# Patient Record
Sex: MALE | Race: WHITE | Employment: UNEMPLOYED | URBAN - METROPOLITAN AREA
[De-identification: names, ages, dates, MRNs, and addresses within clinical notes are randomized per-mention and may not be internally consistent; named-entity substitution may affect disease eponyms.]

---

## 2019-07-16 ENCOUNTER — APPOINTMENT (OUTPATIENT)
Dept: GENERAL RADIOLOGY | Facility: CLINIC | Age: 24
End: 2019-07-16
Attending: EMERGENCY MEDICINE
Payer: COMMERCIAL

## 2019-07-16 ENCOUNTER — HOSPITAL ENCOUNTER (EMERGENCY)
Facility: CLINIC | Age: 24
Discharge: HOME | End: 2019-07-16
Attending: EMERGENCY MEDICINE | Admitting: EMERGENCY MEDICINE
Payer: COMMERCIAL

## 2019-07-16 VITALS
HEIGHT: 65 IN | HEART RATE: 86 BPM | TEMPERATURE: 98.2 F | SYSTOLIC BLOOD PRESSURE: 127 MMHG | RESPIRATION RATE: 16 BRPM | OXYGEN SATURATION: 96 % | WEIGHT: 124 LBS | DIASTOLIC BLOOD PRESSURE: 68 MMHG | BODY MASS INDEX: 20.66 KG/M2

## 2019-07-16 DIAGNOSIS — R94.31 NONSPECIFIC ABNORMAL ELECTROCARDIOGRAM (ECG) (EKG): ICD-10-CM

## 2019-07-16 DIAGNOSIS — R00.0 SINUS TACHYCARDIA: ICD-10-CM

## 2019-07-16 DIAGNOSIS — F41.9 ANXIETY: ICD-10-CM

## 2019-07-16 LAB
ANION GAP SERPL CALCULATED.3IONS-SCNC: 4 MMOL/L (ref 3–14)
BASOPHILS # BLD AUTO: 0 10E9/L (ref 0–0.2)
BASOPHILS NFR BLD AUTO: 0.2 %
BUN SERPL-MCNC: 13 MG/DL (ref 7–30)
CALCIUM SERPL-MCNC: 9.5 MG/DL (ref 8.5–10.1)
CHLORIDE SERPL-SCNC: 109 MMOL/L (ref 94–109)
CO2 SERPL-SCNC: 27 MMOL/L (ref 20–32)
CREAT SERPL-MCNC: 0.98 MG/DL (ref 0.66–1.25)
D DIMER PPP FEU-MCNC: <0.3 UG/ML FEU (ref 0–0.5)
DIFFERENTIAL METHOD BLD: ABNORMAL
EOSINOPHIL # BLD AUTO: 0 10E9/L (ref 0–0.7)
EOSINOPHIL NFR BLD AUTO: 0.4 %
ERYTHROCYTE [DISTWIDTH] IN BLOOD BY AUTOMATED COUNT: 13.2 % (ref 10–15)
GFR SERPL CREATININE-BSD FRML MDRD: >90 ML/MIN/{1.73_M2}
GLUCOSE SERPL-MCNC: 116 MG/DL (ref 70–99)
HCT VFR BLD AUTO: 43.7 % (ref 40–53)
HGB BLD-MCNC: 15.2 G/DL (ref 13.3–17.7)
IMM GRANULOCYTES # BLD: 0 10E9/L (ref 0–0.4)
IMM GRANULOCYTES NFR BLD: 0.4 %
INTERPRETATION ECG - MUSE: NORMAL
LYMPHOCYTES # BLD AUTO: 1.3 10E9/L (ref 0.8–5.3)
LYMPHOCYTES NFR BLD AUTO: 11.8 %
MCH RBC QN AUTO: 27.7 PG (ref 26.5–33)
MCHC RBC AUTO-ENTMCNC: 34.8 G/DL (ref 31.5–36.5)
MCV RBC AUTO: 80 FL (ref 78–100)
MONOCYTES # BLD AUTO: 0.6 10E9/L (ref 0–1.3)
MONOCYTES NFR BLD AUTO: 5.8 %
NEUTROPHILS # BLD AUTO: 8.7 10E9/L (ref 1.6–8.3)
NEUTROPHILS NFR BLD AUTO: 81.4 %
NRBC # BLD AUTO: 0 10*3/UL
NRBC BLD AUTO-RTO: 0 /100
PLATELET # BLD AUTO: 298 10E9/L (ref 150–450)
POTASSIUM SERPL-SCNC: 3.9 MMOL/L (ref 3.4–5.3)
RBC # BLD AUTO: 5.49 10E12/L (ref 4.4–5.9)
SODIUM SERPL-SCNC: 140 MMOL/L (ref 133–144)
TROPONIN I SERPL-MCNC: <0.015 UG/L (ref 0–0.04)
TSH SERPL DL<=0.005 MIU/L-ACNC: 0.84 MU/L (ref 0.4–4)
WBC # BLD AUTO: 10.7 10E9/L (ref 4–11)

## 2019-07-16 PROCEDURE — 25000132 ZZH RX MED GY IP 250 OP 250 PS 637: Performed by: EMERGENCY MEDICINE

## 2019-07-16 PROCEDURE — 80048 BASIC METABOLIC PNL TOTAL CA: CPT | Performed by: EMERGENCY MEDICINE

## 2019-07-16 PROCEDURE — 85025 COMPLETE CBC W/AUTO DIFF WBC: CPT | Performed by: EMERGENCY MEDICINE

## 2019-07-16 PROCEDURE — 71046 X-RAY EXAM CHEST 2 VIEWS: CPT

## 2019-07-16 PROCEDURE — 93005 ELECTROCARDIOGRAM TRACING: CPT

## 2019-07-16 PROCEDURE — 84484 ASSAY OF TROPONIN QUANT: CPT | Performed by: EMERGENCY MEDICINE

## 2019-07-16 PROCEDURE — 99285 EMERGENCY DEPT VISIT HI MDM: CPT | Mod: 25

## 2019-07-16 PROCEDURE — 85379 FIBRIN DEGRADATION QUANT: CPT | Performed by: EMERGENCY MEDICINE

## 2019-07-16 PROCEDURE — 84443 ASSAY THYROID STIM HORMONE: CPT | Performed by: EMERGENCY MEDICINE

## 2019-07-16 RX ORDER — HYDROXYZINE PAMOATE 50 MG/1
50 CAPSULE ORAL EVERY 6 HOURS PRN
Qty: 30 CAPSULE | Refills: 0 | Status: SHIPPED | OUTPATIENT
Start: 2019-07-16 | End: 2019-08-19

## 2019-07-16 RX ORDER — LORAZEPAM 0.5 MG/1
1 TABLET ORAL ONCE
Status: COMPLETED | OUTPATIENT
Start: 2019-07-16 | End: 2019-07-16

## 2019-07-16 RX ORDER — TRAZODONE HYDROCHLORIDE 100 MG/1
100 TABLET ORAL AT BEDTIME
Qty: 30 TABLET | Refills: 0 | Status: SHIPPED | OUTPATIENT
Start: 2019-07-16 | End: 2019-08-19

## 2019-07-16 RX ADMIN — LORAZEPAM 1 MG: 0.5 TABLET ORAL at 18:25

## 2019-07-16 ASSESSMENT — ENCOUNTER SYMPTOMS
PALPITATIONS: 1
NERVOUS/ANXIOUS: 1
APPETITE CHANGE: 1

## 2019-07-16 ASSESSMENT — MIFFLIN-ST. JEOR: SCORE: 1489.34

## 2019-07-16 NOTE — ED AVS SNAPSHOT
Emergency Department  [address] [place] [id number]  Phone:  [phone]  Fax:  [phone]                                    Mahin Porter   MRN: [id number]    Department:   Emergency Department   Date of Visit:  7/16/2019           After Visit Summary Signature Page    I have received my discharge instructions, and my questions have been answered. I have discussed any challenges I see with this plan with the nurse or doctor.    ..........................................................................................................................................  Patient/Patient Representative Signature      ..........................................................................................................................................  Patient Representative Print Name and Relationship to Patient    ..................................................               ................................................  Date                                   Time    ..........................................................................................................................................  Reviewed by Signature/Title    ...................................................              ..............................................  Date                                               Time          22EPIC Rev 08/18

## 2019-07-16 NOTE — ED TRIAGE NOTES
"Patient in with complaints of panic attacks for the past few weeks. States not sleeping well during that time. Complains racing heart. Complains of SOB and feeling tired. States chest discomfort with the racing heart. \"I feel like shit, overwhelmed, anxious..my brain hurts. I feel like I am dying.\"  "
WDL

## 2019-07-16 NOTE — ED PROVIDER NOTES
History     Chief Complaint:  Anxiety    HPI   Mahin Porter is a 24 year old male with a history of anxiety depression who presents with his sister to the ED for the evaluation of anxiety. The patient reports that for the past month he has been experiencing worsening anxiety that causes him to shake intermittently and not be able to sleep, prompting him to the ED. The patient states that he has also been experiencing a decreased appetite for the past month due to his anxiety and that he is also suffering from depression. He states that he recently quit his job and is experiencing some anxiety over being unemployed. He also notes that he will experience intermittent episodes of a racing heart and palpitations. The patient's sister states that they do have a nurse practitioner who they have seen before for anxiety and depression and are planning on setting up an appointment with them in the near future.  No drug use recently. The patient denies suicidal thoughts and other issues.      Allergies:  No Known Drug Allergies     Medications:    The patient is currently on no regular medications.     Past Medical History:    Anxiety  Depression    Past Surgical History:    History reviewed. No pertinent surgical history.    Family History:    Heart murmer    Social History:  The patient was accompanied to the ED by sister.  Smoking Status: Current Every Day Smoker, PPD: 0.50  Smokeless Tobacco: No  Alcohol Use: Yes  Drug use: Marijuana   Marital Status: Single        Review of Systems   Constitutional: Positive for appetite change.   Cardiovascular: Positive for palpitations.   Psychiatric/Behavioral: Negative for suicidal ideas. The patient is nervous/anxious.    All other systems reviewed and are negative.    Physical Exam     Patient Vitals for the past 24 hrs:   BP Temp Temp src Pulse Resp SpO2 Height Weight   07/16/19 1825 127/68 -- -- 86 16 96 % -- --   07/16/19 1529 145/85 98.2  F (36.8  C) Oral 132 20 97 % 1.651  "m (5' 5\") 56.2 kg (124 lb)        Physical Exam  Nursing note and vitals reviewed.  Constitutional:  Appears well-developed and well-nourished.   HENT:   Head:    Atraumatic.   Mouth/Throat:   Oropharynx is clear and moist. No oropharyngeal exudate.   Eyes:    Pupils are equal, round, and reactive to light.   Neck:    Normal range of motion. Neck supple.      No tracheal deviation present. No thyromegaly present.   Cardiovascular:  Tachycardic rate, regular rhythm, no murmur   Pulmonary/Chest: Breath sounds are clear and equal without wheezes or crackles.  Abdominal:   Soft. Bowel sounds are normal. Exhibits no distension and      no mass. There is no tenderness.      There is no rebound and no guarding.   Musculoskeletal:  Exhibits no edema.   Lymphadenopathy:  No cervical adenopathy.   Neurological:   Alert and oriented to person, place, and time.   Skin:    Skin is warm and dry. No rash noted. No pallor.    Psych:   Makes good eye contact, calm and cooperative     Emergency Department Course   ECG (15:34:34):  Rate 115 bpm. NV interval 136. QRS duration 90. QT/QTc 316/437. P-R-T axes 87 94 58. Sinus tachycardia. Biatrial enlargement. Rightward axis. Cannot rule out Anterior infarct, age undetermined. Abnormal ECG.  Interpreted at 1534 by Stacia Boss MD.     Imaging  Radiographic findings were communicated with the patient and sister who voiced understanding of the findings.  Chest XR, PA and LAT  IMPRESSION: No acute pulmonary disease.  As read by Radiology.     Laboratory:  Troponin (1652): <0.015  D dimer quantitative (1652): <0.3  CBC: WNL (WBC 10.7, HGB 15.2, )  BMP: Glucose 116 (H), WNL (Creatinine 0.98)   TSH with free T4 reflex: 0.84    Interventions:  1825, Ativan, 1 mg, IV    Emergency Department Course:  Past medical records, nursing notes, and vitals reviewed.  1608: I performed an exam of the patient and obtained history, as documented above.     IV inserted and blood drawn.    1749: I " spoke with Dr. Smith of Cardiology.     The patient was sent for a chest x ray while in the emergency department, findings above.    1830: I rechecked the patient.  Findings and plan explained to the Patient and sister. Patient discharged home with instructions regarding supportive care, medications, and reasons to return. The importance of close follow-up was reviewed.      Impression & Plan    Medical Decision Making:  I feel that this patient's symptoms are due to anxiety and his heart and blood pressure were much improved after Ativan orally here. He did have nonspecific ECG abnormalities so I discussed the case with Dr. Smith who was the on call for Cardiology who agreed that these changes were nonspecific and most likely due to the patient's body habitus. Based on our discussion I ordered an outpatient cardiac echo and cardiology follow up especially considering the patient's family history of heart murmer and because of his unexplained sinus tachycardia which could possibly be due to a pheochromocytoma, although I feel this is very unlikely since his blood pressure and heart did return to normal here. I discussed with the patient and his sister that he should have follow up with cardiology and they could perform 24 hour urine collection to check for this. He was instructed on signs and symptoms to return for. There was no sign of myocardial infarction or pulmonary embolism. He does not have risk factor for PE and his d dimer is normal. He is not hypoxic so I did not feel that chest ct was indicated. There was no sign of pneumothorax or pneumonia and I did not feel that there was a sign for pericardial effusion. I felt that he could be safely discharged home. He was prescribed trazodone for sleep and hydroxyzine for anxiety. I told him not to consume alcohol or drive a car eight hours after taking these. He declined DEC evaluation and he is not suicidal or homicidal and not having symptoms of psychosis. He  has a plan to contact his psychologist Tee at [facility] and she is also a prescriber who can also prescribe him an antidepressant and antianxiety medication since she is a nurse practitioner. I felt that he could be safely discharged to home.     Diagnosis:    ICD-10-CM    1. Anxiety F41.9    2. Sinus tachycardia R00.0 Echocardiogram Complete with Bubble     Follow-Up with Cardiologist   3. Nonspecific abnormal electrocardiogram (ECG) (EKG) R94.31        Disposition:  discharged to home    Discharge Medications:     Medication List      Started    hydrOXYzine 50 MG capsule  Commonly known as:  VISTARIL  50 mg, Oral, EVERY 6 HOURS PRN, No driving a car or drinking alcohol for 6 hours after taking this medication.     traZODone 100 MG tablet  Commonly known as:  DESYREL  100 mg, Oral, AT BEDTIME, No driving a car or drinking alcohol for 8 hours after taking this medication.          Scribe Disclosure:  I, Zack Andrade, am serving as a scribe at 3:42 PM on 7/16/2019 to document services personally performed by Stacia Boss MD based on my observations and the provider's statements to me.      Zack Andrade  7/16/2019    EMERGENCY DEPARTMENT       Stacia Boss MD  07/16/19 2661

## 2019-07-18 ENCOUNTER — HOSPITAL ENCOUNTER (OUTPATIENT)
Dept: CARDIOLOGY | Facility: CLINIC | Age: 24
Discharge: HOME | End: 2019-07-18
Attending: EMERGENCY MEDICINE | Admitting: EMERGENCY MEDICINE
Payer: COMMERCIAL

## 2019-07-18 DIAGNOSIS — R00.0 SINUS TACHYCARDIA: ICD-10-CM

## 2019-07-18 PROCEDURE — 93306 TTE W/DOPPLER COMPLETE: CPT | Mod: 26 | Performed by: INTERNAL MEDICINE

## 2019-07-18 PROCEDURE — 93306 TTE W/DOPPLER COMPLETE: CPT

## 2019-07-22 ENCOUNTER — OFFICE VISIT (OUTPATIENT)
Dept: CARDIOLOGY | Facility: CLINIC | Age: 24
End: 2019-07-22
Attending: EMERGENCY MEDICINE
Payer: COMMERCIAL

## 2019-07-22 VITALS
HEART RATE: 80 BPM | DIASTOLIC BLOOD PRESSURE: 79 MMHG | SYSTOLIC BLOOD PRESSURE: 124 MMHG | HEIGHT: 65 IN | WEIGHT: 126 LBS | BODY MASS INDEX: 20.99 KG/M2

## 2019-07-22 DIAGNOSIS — F41.9 ANXIETY: ICD-10-CM

## 2019-07-22 DIAGNOSIS — R00.2 PALPITATIONS: Primary | ICD-10-CM

## 2019-07-22 DIAGNOSIS — R00.0 SINUS TACHYCARDIA: ICD-10-CM

## 2019-07-22 PROBLEM — F43.23 ADJUSTMENT DISORDER WITH MIXED ANXIETY AND DEPRESSED MOOD: Status: ACTIVE | Noted: 2019-07-22

## 2019-07-22 PROCEDURE — 99204 OFFICE O/P NEW MOD 45 MIN: CPT | Performed by: INTERNAL MEDICINE

## 2019-07-22 ASSESSMENT — MIFFLIN-ST. JEOR: SCORE: 1498.41

## 2019-07-22 NOTE — PROGRESS NOTES
HPI and Plan:   This 24-year-old young man is seen, accompanied by his mother, in follow-up after recent emergency room visit where he was noted to be quite tachycardic and mildly hypertensive.  There is a question of an abnormal EKG.  Follow-up evaluation to reassess this was recommended and requested.    History is from him but also from his mother.  He reports a history of anxiety and what sounds like anxious depression.  Previously helped considerably by counseling but he has not been in that recently.  More recently he is acutely worsened with recurrent panic and anxiety attacks.  His mother notes this been worse after a loss of a job recently.  The can come on quite suddenly even when he is doing normal activities and he is not sure why they are precipitated but he starts thinking about perseverating upon mortality and his health and this makes him even more anxious and he started to feel his heart race.  He then feels a sense of dread overwhelmed, anxious like his brain hurts his symptoms like he might be dying.  He often calls a sister somebody else and with encouragement and counseling symptoms slowly resolve.  He never gets palpitations or rapid heart rate in the absence of 1 of these episodes.  He has tried to get into see his counselor but has not been able to get an appointment.  He becomes anxious and tearful when discussing this at times and that does precipitate some tachycardia that goes away when he relaxes.  His mother confirms this history and also becomes tearful at times because she is distracted how hard it is to get him care and how poorly he feels when he has these episodes.    In between episodes he is able to do things physically normally such as skateboarding and other activities.  He has not had syncope or near syncope, denies shortness of breath when active and not having anxiety episodes, and is without orthopnea edema or PND or orthostasis.  He only gets his tachycardia after the  anxiety symptoms come on.    Exam detailed below.  Essentially normal physical exam today other than evidence of anxiety intermittently.    EKG from the emergency room visit is reviewed.  There is sinus tachycardia.  There is borderline right axis deviation and prominent voltage but this thing is within normal limits for this thin young male.    He had an echocardiogram in follow-up.  I reviewed the results with him and his mother.  This is essentially a normal echocardiogram.  There is a trace positive bubble study with Valsalva.    Impression/plan    1-anxiety disorder likely with some depression.  He clearly is quite affected by this.  He is not having any suicidal or self-harm thoughts however.  I strongly urged him to recontact his previous counselor as at school he would like to see because he thought she was quite effective.  He should try to let them know he has been in the emergency department and needs sooner rather than later help in management.  I do not think further cardiac evaluation useful at this time.  I discussed that with him and his mother.  He actually feels quite reassured by this and found it helpful.        No orders of the defined types were placed in this encounter.      No orders of the defined types were placed in this encounter.      There are no discontinued medications.      Encounter Diagnoses   Name Primary?     Palpitations Yes     Sinus tachycardia      Anxiety        CURRENT MEDICATIONS:  Current Outpatient Medications   Medication Sig Dispense Refill     hydrOXYzine (VISTARIL) 50 MG capsule Take 1 capsule (50 mg) by mouth every 6 hours as needed for anxiety No driving a car or drinking alcohol for 6 hours after taking this medication. 30 capsule 0     traZODone (DESYREL) 100 MG tablet Take 1 tablet (100 mg) by mouth At Bedtime No driving a car or drinking alcohol for 8 hours after taking this medication. 30 tablet 0       ALLERGIES   No Known Allergies    PAST MEDICAL  HISTORY:  History reviewed. No pertinent past medical history.    PAST SURGICAL HISTORY:  History reviewed. No pertinent surgical history.    FAMILY HISTORY:  Family History   Problem Relation Age of Onset     Coronary Artery Disease Father      Depression Father      Lung Cancer Maternal Grandmother      Lymphoma Maternal Grandfather      Lung Cancer Paternal Grandmother      Depression Sister      Anxiety Disorder Sister        SOCIAL HISTORY:  Social History     Socioeconomic History     Marital status: Single     Spouse name: None     Number of children: None     Years of education: None     Highest education level: None   Occupational History     None   Social Needs     Financial resource strain: None     Food insecurity:     Worry: None     Inability: None     Transportation needs:     Medical: None     Non-medical: None   Tobacco Use     Smoking status: Current Every Day Smoker     Packs/day: 0.50     Types: Cigarettes     Start date: 2010     Smokeless tobacco: Never Used   Substance and Sexual Activity     Alcohol use: Yes     Comment: holidays     Drug use: Yes     Types: Marijuana     Sexual activity: None   Lifestyle     Physical activity:     Days per week: None     Minutes per session: None     Stress: None   Relationships     Social connections:     Talks on phone: None     Gets together: None     Attends [employer] service: None     Active member of club or organization: None     Attends meetings of clubs or organizations: None     Relationship status: None     Intimate partner violence:     Fear of current or ex partner: None     Emotionally abused: None     Physically abused: None     Forced sexual activity: None   Other Topics Concern     Parent/sibling w/ CABG, MI or angioplasty before 65F 55M? Not Asked   Social History Narrative     None       Review of Systems:  Skin:  Negative       Eyes:  Positive for glasses    ENT:  Positive for nasal congestion    Respiratory:  Negative      "  Cardiovascular:  Negative      Gastroenterology: Positive for heartburn;reflux possible ulcer  Genitourinary:  Negative      Musculoskeletal:  Positive for back pain    Neurologic:  Negative      Psychiatric:  Positive for anxiety;depression treated with Trazadone  Heme/Lymph/Imm:  Negative      Endocrine:  Negative        Physical Exam:  Vitals: /79   Pulse 80   Ht 1.651 m (5' 5\")   Wt 57.2 kg (126 lb)   BMI 20.97 kg/m      Constitutional:  cooperative, alert and oriented, well developed, well nourished, in no acute distress        Skin:  warm and dry to the touch, no apparent skin lesions or masses noted          Head:  normocephalic, no masses or lesions        Eyes:  pupils equal and round;sclera white;EOMS intact        Lymph:      ENT:  no pallor or cyanosis        Neck:  carotid pulses are full and equal bilaterally, JVP normal, no carotid bruit        Respiratory:  normal breath sounds, clear to auscultation, normal A-P diameter, normal symmetry, normal respiratory excursion, no use of accessory muscles         Cardiac: regular rhythm, normal S1/S2, no S3 or S4, apical impulse not displaced, no murmurs, gallops or rubs(Grets tachycardic when discussing his anxiety feelings and getting tearful)                pulses full and equal                                        GI:  abdomen soft;no HSM;non-tender;no bruits        Extremities and Muscular Skeletal:  no deformities, clubbing, cyanosis, erythema observed;no edema              Neurological:  no gross motor deficits        Psych:  Alert and Oriented x 3 Anxious      CC  Stacia Boss MD  [facility]  [address] [address]              "

## 2019-07-22 NOTE — LETTER
7/22/2019    Physician No Ref-Primary  No address on file    RE: Mahin HUBER German       Dear Colleague,    I had the pleasure of seeing Mahin Porter in the [facility].    HPI and Plan:   This 24-year-old young man is seen, accompanied by his mother, in follow-up after recent emergency room visit where he was noted to be quite tachycardic and mildly hypertensive.  There is a question of an abnormal EKG.  Follow-up evaluation to reassess this was recommended and requested.    History is from him but also from his mother.  He reports a history of anxiety and what sounds like anxious depression.  Previously helped considerably by counseling but he has not been in that recently.  More recently he is acutely worsened with recurrent panic and anxiety attacks.  His mother notes this been worse after a loss of a job recently.  The can come on quite suddenly even when he is doing normal activities and he is not sure why they are precipitated but he starts thinking about perseverating upon mortality and his health and this makes him even more anxious and he started to feel his heart race.  He then feels a sense of dread overwhelmed, anxious like his brain hurts his symptoms like he might be dying.  He often calls a sister somebody else and with encouragement and counseling symptoms slowly resolve.  He never gets palpitations or rapid heart rate in the absence of 1 of these episodes.  He has tried to get into see his counselor but has not been able to get an appointment.  He becomes anxious and tearful when discussing this at times and that does precipitate some tachycardia that goes away when he relaxes.  His mother confirms this history and also becomes tearful at times because she is distracted how hard it is to get him care and how poorly he feels when he has these episodes.    In between episodes he is able to do things physically normally such as skateboarding and other activities.  He has  not had syncope or near syncope, denies shortness of breath when active and not having anxiety episodes, and is without orthopnea edema or PND or orthostasis.  He only gets his tachycardia after the anxiety symptoms come on.    Exam detailed below.  Essentially normal physical exam today other than evidence of anxiety intermittently.    EKG from the emergency room visit is reviewed.  There is sinus tachycardia.  There is borderline right axis deviation and prominent voltage but this thing is within normal limits for this thin young male.    He had an echocardiogram in follow-up.  I reviewed the results with him and his mother.  This is essentially a normal echocardiogram.  There is a trace positive bubble study with Valsalva.    Impression/plan    1-anxiety disorder likely with some depression.  He clearly is quite affected by this.  He is not having any suicidal or self-harm thoughts however.  I strongly urged him to recontact his previous counselor as at school he would like to see because he thought she was quite effective.  He should try to let them know he has been in the emergency department and needs sooner rather than later help in management.  I do not think further cardiac evaluation useful at this time.  I discussed that with him and his mother.  He actually feels quite reassured by this and found it helpful.        No orders of the defined types were placed in this encounter.      No orders of the defined types were placed in this encounter.      There are no discontinued medications.      Encounter Diagnoses   Name Primary?     Palpitations Yes     Sinus tachycardia      Anxiety        CURRENT MEDICATIONS:  Current Outpatient Medications   Medication Sig Dispense Refill     hydrOXYzine (VISTARIL) 50 MG capsule Take 1 capsule (50 mg) by mouth every 6 hours as needed for anxiety No driving a car or drinking alcohol for 6 hours after taking this medication. 30 capsule 0     traZODone (DESYREL) 100 MG  tablet Take 1 tablet (100 mg) by mouth At Bedtime No driving a car or drinking alcohol for 8 hours after taking this medication. 30 tablet 0       ALLERGIES   No Known Allergies    PAST MEDICAL HISTORY:  History reviewed. No pertinent past medical history.    PAST SURGICAL HISTORY:  History reviewed. No pertinent surgical history.    FAMILY HISTORY:  Family History   Problem Relation Age of Onset     Coronary Artery Disease Father      Depression Father      Lung Cancer Maternal Grandmother      Lymphoma Maternal Grandfather      Lung Cancer Paternal Grandmother      Depression Sister      Anxiety Disorder Sister        SOCIAL HISTORY:  Social History     Socioeconomic History     Marital status: Single     Spouse name: None     Number of children: None     Years of education: None     Highest education level: None   Occupational History     None   Social Needs     Financial resource strain: None     Food insecurity:     Worry: None     Inability: None     Transportation needs:     Medical: None     Non-medical: None   Tobacco Use     Smoking status: Current Every Day Smoker     Packs/day: 0.50     Types: Cigarettes     Start date: 2010     Smokeless tobacco: Never Used   Substance and Sexual Activity     Alcohol use: Yes     Comment: holidays     Drug use: Yes     Types: Marijuana     Sexual activity: None   Lifestyle     Physical activity:     Days per week: None     Minutes per session: None     Stress: None   Relationships     Social connections:     Talks on phone: None     Gets together: None     Attends Jewish service: None     Active member of club or organization: None     Attends meetings of clubs or organizations: None     Relationship status: None     Intimate partner violence:     Fear of current or ex partner: None     Emotionally abused: None     Physically abused: None     Forced sexual activity: None   Other Topics Concern     Parent/sibling w/ CABG, MI or angioplasty before 65F 55M? Not Asked  "  Social History Narrative     None       Review of Systems:  Skin:  Negative       Eyes:  Positive for glasses    ENT:  Positive for nasal congestion    Respiratory:  Negative       Cardiovascular:  Negative      Gastroenterology: Positive for heartburn;reflux possible ulcer  Genitourinary:  Negative      Musculoskeletal:  Positive for back pain    Neurologic:  Negative      Psychiatric:  Positive for anxiety;depression treated with Trazadone  Heme/Lymph/Imm:  Negative      Endocrine:  Negative        Physical Exam:  Vitals: /79   Pulse 80   Ht 1.651 m (5' 5\")   Wt 57.2 kg (126 lb)   BMI 20.97 kg/m       Constitutional:  cooperative, alert and oriented, well developed, well nourished, in no acute distress        Skin:  warm and dry to the touch, no apparent skin lesions or masses noted          Head:  normocephalic, no masses or lesions        Eyes:  pupils equal and round;sclera white;EOMS intact        Lymph:      ENT:  no pallor or cyanosis        Neck:  carotid pulses are full and equal bilaterally, JVP normal, no carotid bruit        Respiratory:  normal breath sounds, clear to auscultation, normal A-P diameter, normal symmetry, normal respiratory excursion, no use of accessory muscles         Cardiac: regular rhythm, normal S1/S2, no S3 or S4, apical impulse not displaced, no murmurs, gallops or rubs(Grets tachycardic when discussing his anxiety feelings and getting tearful)                pulses full and equal                                        GI:  abdomen soft;no HSM;non-tender;no bruits        Extremities and Muscular Skeletal:  no deformities, clubbing, cyanosis, erythema observed;no edema              Neurological:  no gross motor deficits        Psych:  Alert and Oriented x 3 Anxious      CC  Stacia Boss MD  [facility]  [address] [address]                Thank you for allowing me to participate in the care of your patient.      Sincerely,     Liborio" Apolinar Smith MD     [facility]    cc:   Stacia Boss MD  [facility]  [address] [facility] [address]  [place] [address]

## 2019-07-24 ENCOUNTER — OFFICE VISIT (OUTPATIENT)
Dept: FAMILY MEDICINE | Facility: CLINIC | Age: 24
End: 2019-07-24
Payer: COMMERCIAL

## 2019-07-24 VITALS
BODY MASS INDEX: 21.16 KG/M2 | RESPIRATION RATE: 20 BRPM | HEART RATE: 95 BPM | SYSTOLIC BLOOD PRESSURE: 120 MMHG | HEIGHT: 65 IN | DIASTOLIC BLOOD PRESSURE: 60 MMHG | WEIGHT: 127 LBS | OXYGEN SATURATION: 98 % | TEMPERATURE: 98.1 F

## 2019-07-24 DIAGNOSIS — F43.23 ADJUSTMENT DISORDER WITH MIXED ANXIETY AND DEPRESSED MOOD: ICD-10-CM

## 2019-07-24 DIAGNOSIS — Z00.00 ROUTINE GENERAL MEDICAL EXAMINATION AT A HEALTH CARE FACILITY: Primary | ICD-10-CM

## 2019-07-24 DIAGNOSIS — F41.9 ANXIETY: ICD-10-CM

## 2019-07-24 LAB
ALBUMIN UR-MCNC: NEGATIVE MG/DL
APPEARANCE UR: CLEAR
BASOPHILS # BLD AUTO: 0 10E9/L (ref 0–0.2)
BASOPHILS NFR BLD AUTO: 0.6 %
BILIRUB UR QL STRIP: NEGATIVE
COLOR UR AUTO: YELLOW
DIFFERENTIAL METHOD BLD: NORMAL
EOSINOPHIL # BLD AUTO: 0.2 10E9/L (ref 0–0.7)
EOSINOPHIL NFR BLD AUTO: 2.7 %
ERYTHROCYTE [DISTWIDTH] IN BLOOD BY AUTOMATED COUNT: 13.6 % (ref 10–15)
GLUCOSE UR STRIP-MCNC: NEGATIVE MG/DL
HCT VFR BLD AUTO: 40.7 % (ref 40–53)
HGB BLD-MCNC: 14.3 G/DL (ref 13.3–17.7)
HGB UR QL STRIP: NEGATIVE
KETONES UR STRIP-MCNC: NEGATIVE MG/DL
LEUKOCYTE ESTERASE UR QL STRIP: NEGATIVE
LYMPHOCYTES # BLD AUTO: 1.8 10E9/L (ref 0.8–5.3)
LYMPHOCYTES NFR BLD AUTO: 25.4 %
MCH RBC QN AUTO: 28.3 PG (ref 26.5–33)
MCHC RBC AUTO-ENTMCNC: 35.1 G/DL (ref 31.5–36.5)
MCV RBC AUTO: 81 FL (ref 78–100)
MONOCYTES # BLD AUTO: 0.5 10E9/L (ref 0–1.3)
MONOCYTES NFR BLD AUTO: 7 %
NEUTROPHILS # BLD AUTO: 4.6 10E9/L (ref 1.6–8.3)
NEUTROPHILS NFR BLD AUTO: 64.3 %
NITRATE UR QL: NEGATIVE
PH UR STRIP: 7 PH (ref 5–7)
PLATELET # BLD AUTO: 274 10E9/L (ref 150–450)
RBC # BLD AUTO: 5.05 10E12/L (ref 4.4–5.9)
RBC #/AREA URNS AUTO: NORMAL /HPF
SOURCE: NORMAL
SP GR UR STRIP: 1.01 (ref 1–1.03)
UROBILINOGEN UR STRIP-ACNC: 0.2 EU/DL (ref 0.2–1)
WBC # BLD AUTO: 7.2 10E9/L (ref 4–11)
WBC #/AREA URNS AUTO: NORMAL /HPF

## 2019-07-24 PROCEDURE — 85025 COMPLETE CBC W/AUTO DIFF WBC: CPT | Performed by: FAMILY MEDICINE

## 2019-07-24 PROCEDURE — 36415 COLL VENOUS BLD VENIPUNCTURE: CPT | Performed by: FAMILY MEDICINE

## 2019-07-24 PROCEDURE — 80053 COMPREHEN METABOLIC PANEL: CPT | Performed by: FAMILY MEDICINE

## 2019-07-24 PROCEDURE — 80061 LIPID PANEL: CPT | Performed by: FAMILY MEDICINE

## 2019-07-24 PROCEDURE — 81001 URINALYSIS AUTO W/SCOPE: CPT | Performed by: FAMILY MEDICINE

## 2019-07-24 PROCEDURE — 84443 ASSAY THYROID STIM HORMONE: CPT | Performed by: FAMILY MEDICINE

## 2019-07-24 PROCEDURE — 99385 PREV VISIT NEW AGE 18-39: CPT | Performed by: FAMILY MEDICINE

## 2019-07-24 RX ORDER — ESCITALOPRAM OXALATE 10 MG/1
TABLET ORAL
Refills: 2 | COMMUNITY
Start: 2018-12-02 | End: 2019-07-24

## 2019-07-24 RX ORDER — BUPROPION HYDROCHLORIDE 150 MG/1
150 TABLET ORAL EVERY MORNING
Qty: 30 TABLET | Refills: 1 | Status: SHIPPED | OUTPATIENT
Start: 2019-07-24 | End: 2019-08-27 | Stop reason: DRUGHIGH

## 2019-07-24 ASSESSMENT — ANXIETY QUESTIONNAIRES
5. BEING SO RESTLESS THAT IT IS HARD TO SIT STILL: SEVERAL DAYS
7. FEELING AFRAID AS IF SOMETHING AWFUL MIGHT HAPPEN: NEARLY EVERY DAY
6. BECOMING EASILY ANNOYED OR IRRITABLE: MORE THAN HALF THE DAYS
GAD7 TOTAL SCORE: 18
7. FEELING AFRAID AS IF SOMETHING AWFUL MIGHT HAPPEN: NEARLY EVERY DAY
2. NOT BEING ABLE TO STOP OR CONTROL WORRYING: NEARLY EVERY DAY
1. FEELING NERVOUS, ANXIOUS, OR ON EDGE: NEARLY EVERY DAY
4. TROUBLE RELAXING: NEARLY EVERY DAY
3. WORRYING TOO MUCH ABOUT DIFFERENT THINGS: NEARLY EVERY DAY
GAD7 TOTAL SCORE: 18

## 2019-07-24 ASSESSMENT — PATIENT HEALTH QUESTIONNAIRE - PHQ9
SUM OF ALL RESPONSES TO PHQ QUESTIONS 1-9: 15
SUM OF ALL RESPONSES TO PHQ QUESTIONS 1-9: 15
10. IF YOU CHECKED OFF ANY PROBLEMS, HOW DIFFICULT HAVE THESE PROBLEMS MADE IT FOR YOU TO DO YOUR WORK, TAKE CARE OF THINGS AT HOME, OR GET ALONG WITH OTHER PEOPLE: VERY DIFFICULT

## 2019-07-24 ASSESSMENT — MIFFLIN-ST. JEOR: SCORE: 1502.95

## 2019-07-24 NOTE — LETTER
July 25, 2019      Mahin Porter  [address] [address]        Dear Peter,    We are writing to inform you of your test results.    Your tests are all normal. I will see you in a month.    Resulted Orders   UA with Microscopic   Result Value Ref Range    Color Urine Yellow     Appearance Urine Clear     Glucose Urine Negative NEG^Negative mg/dL    Bilirubin Urine Negative NEG^Negative    Ketones Urine Negative NEG^Negative mg/dL    Specific Gravity Urine 1.015 1.003 - 1.035    pH Urine 7.0 5.0 - 7.0 pH    Protein Albumin Urine Negative NEG^Negative mg/dL    Urobilinogen Urine 0.2 0.2 - 1.0 EU/dL    Nitrite Urine Negative NEG^Negative    Blood Urine Negative NEG^Negative    Leukocyte Esterase Urine Negative NEG^Negative    Source Midstream Urine     WBC Urine 0 - 5 OTO5^0 - 5 /HPF    RBC Urine O - 2 OTO2^O - 2 /HPF   CBC with platelets differential   Result Value Ref Range    WBC 7.2 4.0 - 11.0 10e9/L    RBC Count 5.05 4.4 - 5.9 10e12/L    Hemoglobin 14.3 13.3 - 17.7 g/dL    Hematocrit 40.7 40.0 - 53.0 %    MCV 81 78 - 100 fl    MCH 28.3 26.5 - 33.0 pg    MCHC 35.1 31.5 - 36.5 g/dL    RDW 13.6 10.0 - 15.0 %    Platelet Count 274 150 - 450 10e9/L    % Neutrophils 64.3 %    % Lymphocytes 25.4 %    % Monocytes 7.0 %    % Eosinophils 2.7 %    % Basophils 0.6 %    Absolute Neutrophil 4.6 1.6 - 8.3 10e9/L    Absolute Lymphocytes 1.8 0.8 - 5.3 10e9/L    Absolute Monocytes 0.5 0.0 - 1.3 10e9/L    Absolute Eosinophils 0.2 0.0 - 0.7 10e9/L    Absolute Basophils 0.0 0.0 - 0.2 10e9/L    Diff Method Automated Method    Comprehensive metabolic panel   Result Value Ref Range    Sodium 137 133 - 144 mmol/L    Potassium 4.3 3.4 - 5.3 mmol/L    Chloride 108 94 - 109 mmol/L    Carbon Dioxide 22 20 - 32 mmol/L    Anion Gap 7 3 - 14 mmol/L    Glucose 90 70 - 99 mg/dL      Comment:      Non Fasting    Urea Nitrogen 15 7 - 30 mg/dL    Creatinine 0.99 0.66 - 1.25 mg/dL    GFR Estimate >90 >60 mL/min/[1.73_m2]      Comment:       Non  GFR Calc  Starting 12/18/2018, serum creatinine based estimated GFR (eGFR) will be   calculated using the Chronic Kidney Disease Epidemiology Collaboration   (CKD-EPI) equation.      GFR Estimate If Black >90 >60 mL/min/[1.73_m2]      Comment:       GFR Calc  Starting 12/18/2018, serum creatinine based estimated GFR (eGFR) will be   calculated using the Chronic Kidney Disease Epidemiology Collaboration   (CKD-EPI) equation.      Calcium 9.0 8.5 - 10.1 mg/dL    Bilirubin Total 0.4 0.2 - 1.3 mg/dL    Albumin 4.3 3.4 - 5.0 g/dL    Protein Total 7.3 6.8 - 8.8 g/dL    Alkaline Phosphatase 79 40 - 150 U/L    ALT 20 0 - 70 U/L    AST 8 0 - 45 U/L   Lipid Profile   Result Value Ref Range    Cholesterol 136 <200 mg/dL    Triglycerides 53 <150 mg/dL      Comment:      Non Fasting    HDL Cholesterol 41 >39 mg/dL    LDL Cholesterol Calculated 84 <100 mg/dL      Comment:      Desirable:       <100 mg/dl    Non HDL Cholesterol 95 <130 mg/dL   TSH   Result Value Ref Range    TSH 0.82 0.40 - 4.00 mU/L       If you have any questions or concerns, please call the clinic at the number listed above.       Sincerely,        Reed Moran MD

## 2019-07-24 NOTE — NURSING NOTE
"Chief Complaint   Patient presents with     Physical     /60   Pulse 95   Temp 98.1  F (36.7  C) (Tympanic)   Resp 20   Ht 1.651 m (5' 5\")   Wt 57.6 kg (127 lb)   SpO2 98%   BMI 21.13 kg/m   Estimated body mass index is 21.13 kg/m  as calculated from the following:    Height as of this encounter: 1.651 m (5' 5\").    Weight as of this encounter: 57.6 kg (127 lb).  BP completed using cuff size: jasmeet Palacios CMA    Health Maintenance Due   Topic Date Due     TOBACCO CESSATION COUNSELING  1997     MADISON ASSESSMENT  1997     HIV SCREENING  07/07/2012     HPV IMMUNIZATION (3 - Male 3-dose series) 04/06/2016     Health Maintenance reviewed at today's visit patient asked to schedule/complete:   Routine Health Visit: Patient agrees to schedule  Immunizations:  Patient agrees to schedule    "

## 2019-07-24 NOTE — PROGRESS NOTES
SUBJECTIVE:   CC: Mahin Porter is an 24 year old male who presents for preventative health visit.   Accompanied by his mother on today's visit  Healthy Habits:     Getting at least 3 servings of Calcium per day:  Yes    Bi-annual eye exam:  NO    Dental care twice a year:  NO    Sleep apnea or symptoms of sleep apnea:  Daytime drowsiness    Diet:  Regular (no restrictions)    Frequency of exercise:  None    Duration of exercise:  N/A    Taking medications regularly:  Yes    Barriers to taking medications:  None    Medication side effects:  None    PHQ-2 Total Score: 5    Additional concerns today:  Yes    Abnormal Mood Symptoms      Duration: unsure    Description:  Depression: YES  Anxiety: YES  Panic attacks: no      Accompanying signs and symptoms: see PHQ-9 and MADISON scores    History (similar episodes/previous evaluation): None    Precipitating or alleviating factors: None    Therapies tried and outcome: none      Today's PHQ-2 Score:   PHQ-2 ( 1999 [employer]) 7/24/2019   Q1: Little interest or pleasure in doing things 2   Q2: Feeling down, depressed or hopeless 3   PHQ-2 Score 5   Q1: Little interest or pleasure in doing things More than half the days   Q2: Feeling down, depressed or hopeless Nearly every day   PHQ-2 Score 5       Abuse: Current or Past(Physical, Sexual or Emotional)- No  Do you feel safe in your environment? Yes    Social History     Tobacco Use     Smoking status: Current Every Day Smoker     Packs/day: 0.50     Types: Cigarettes     Start date: 2010     Smokeless tobacco: Current User   Substance Use Topics     Alcohol use: Yes     Comment: holidays     Alcohol Use 7/24/2019   Prescreen: >3 drinks/day or >7 drinks/week? No   Prescreen: >3 drinks/day or >7 drinks/week? -   No flowsheet data found.    Last PSA: No results found for: PSA    Reviewed orders with patient. Reviewed health maintenance and updated orders accordingly - Yes  Patient Active Problem List   Diagnosis     Adjustment  "disorder with mixed anxiety and depressed mood     Palpitations     Sinus tachycardia     Anxiety     History reviewed. No pertinent surgical history.    Social History     Tobacco Use     Smoking status: Current Every Day Smoker     Packs/day: 0.50     Types: Cigarettes     Start date: 2010     Smokeless tobacco: Current User   Substance Use Topics     Alcohol use: Yes     Comment: holidays     Family History   Problem Relation Age of Onset     No Known Problems Mother      Coronary Artery Disease Father      Depression Father      Lung Cancer Maternal Grandmother      Lymphoma Maternal Grandfather      Lung Cancer Paternal Grandmother      Depression Sister      Anxiety Disorder Sister      Depression Sister            Reviewed and updated as needed this visit by clinical staff  Tobacco  Allergies  Meds  Problems  Med Hx  Surg Hx  Fam Hx  Soc Hx          Reviewed and updated as needed this visit by Provider            Review of Systems  CONSTITUTIONAL: NEGATIVE for fever, chills, change in weight  INTEGUMENTARY/SKIN: NEGATIVE for worrisome rashes, moles or lesions lump on knee  EYES: NEGATIVE for vision changes or irritation  ENT: NEGATIVE for ear, mouth and throat problems  RESP: NEGATIVE for significant cough or SOB  CV: NEGATIVE for chest pain, palpitations or peripheral edema  GI: NEGATIVE for nausea, abdominal pain, heartburn, or change in bowel habits but wonders if he may have hemorrhoids   male: negative for dysuria, hematuria, decreased urinary stream, erectile dysfunction, urethral discharge  MUSCULOSKELETAL: NEGATIVE for significant arthralgias or myalgia  NEURO: NEGATIVE for weakness, dizziness or paresthesias  ENDOCRINE: NEGATIVE for temperature intolerance, skin/hair changes  HEME/ALLERGY/IMMUNE: NEGATIVE for bleeding problems  PSYCHIATRIC: POSITIVE for anxiety and depressed mood    OBJECTIVE:   /60   Pulse 95   Temp 98.1  F (36.7  C) (Tympanic)   Resp 20   Ht 1.651 m (5' 5\")   Wt " "57.6 kg (127 lb)   SpO2 98%   BMI 21.13 kg/m      Physical Exam  GENERAL: healthy, alert and no distress  EYES: Eyes grossly normal to inspection, PERRL and conjunctivae and sclerae normal  HENT: ear canals and TM's normal, nose and mouth without ulcers or lesions  NECK: no adenopathy, no asymmetry, masses, or scars and thyroid normal to palpation  RESP: lungs clear to auscultation - no rales, rhonchi or wheezes  CV: regular rate and rhythm, normal S1 S2, no S3 or S4, no murmur, click or rub, no peripheral edema and peripheral pulses strong  ABDOMEN: soft, nontender, no hepatosplenomegaly, no masses and bowel sounds normal  MS: no gross musculoskeletal defects noted, no edema  SKIN: no suspicious lesions or rashes  NEURO: Normal strength and tone, mentation intact and speech normal  PSYCH: mentation appears normal, affect normal/bright  LYMPH: no cervical, supraclavicular, axillary, or inguinal adenopathy        ASSESSMENT/PLAN:       ICD-10-CM    1. Routine general medical examination at a health care facility Z00.00 UA with Microscopic     CBC with platelets differential     Comprehensive metabolic panel     Lipid Profile     TSH     buPROPion (WELLBUTRIN XL) 150 MG 24 hr tablet   2. Adjustment disorder with mixed anxiety and depressed mood F43.23    3. Anxiety F41.9        COUNSELING:   Reviewed preventive health counseling, as reflected in patient instructions       Regular exercise       Healthy diet/nutrition    Estimated body mass index is 21.13 kg/m  as calculated from the following:    Height as of this encounter: 1.651 m (5' 5\").    Weight as of this encounter: 57.6 kg (127 lb).          reports that he has been smoking cigarettes.  He started smoking about 9 years ago. He has been smoking about 0.50 packs per day. He uses smokeless tobacco.  Tobacco Cessation Action Plan: Information offered: Patient not interested at this time  I placed the patient on Wellbutrin 150 mg daily and will see him back in 1 " month.  We will address his other physical issues at that visit.  Counseling Resources:  ATP IV Guidelines  Pooled Cohorts Equation Calculator  FRAX Risk Assessment  ICSI Preventive Guidelines  Dietary Guidelines for Americans, 2010  USDA's MyPlate  ASA Prophylaxis  Lung CA Screening    Reed Moran MD  [facility]

## 2019-07-25 LAB
ALBUMIN SERPL-MCNC: 4.3 G/DL (ref 3.4–5)
ALP SERPL-CCNC: 79 U/L (ref 40–150)
ALT SERPL W P-5'-P-CCNC: 20 U/L (ref 0–70)
ANION GAP SERPL CALCULATED.3IONS-SCNC: 7 MMOL/L (ref 3–14)
AST SERPL W P-5'-P-CCNC: 8 U/L (ref 0–45)
BILIRUB SERPL-MCNC: 0.4 MG/DL (ref 0.2–1.3)
BUN SERPL-MCNC: 15 MG/DL (ref 7–30)
CALCIUM SERPL-MCNC: 9 MG/DL (ref 8.5–10.1)
CHLORIDE SERPL-SCNC: 108 MMOL/L (ref 94–109)
CHOLEST SERPL-MCNC: 136 MG/DL
CO2 SERPL-SCNC: 22 MMOL/L (ref 20–32)
CREAT SERPL-MCNC: 0.99 MG/DL (ref 0.66–1.25)
GFR SERPL CREATININE-BSD FRML MDRD: >90 ML/MIN/{1.73_M2}
GLUCOSE SERPL-MCNC: 90 MG/DL (ref 70–99)
HDLC SERPL-MCNC: 41 MG/DL
LDLC SERPL CALC-MCNC: 84 MG/DL
NONHDLC SERPL-MCNC: 95 MG/DL
POTASSIUM SERPL-SCNC: 4.3 MMOL/L (ref 3.4–5.3)
PROT SERPL-MCNC: 7.3 G/DL (ref 6.8–8.8)
SODIUM SERPL-SCNC: 137 MMOL/L (ref 133–144)
TRIGL SERPL-MCNC: 53 MG/DL
TSH SERPL DL<=0.005 MIU/L-ACNC: 0.82 MU/L (ref 0.4–4)

## 2019-07-25 ASSESSMENT — ANXIETY QUESTIONNAIRES: GAD7 TOTAL SCORE: 18

## 2019-08-19 DIAGNOSIS — F41.9 ANXIETY: Primary | ICD-10-CM

## 2019-08-19 DIAGNOSIS — Z00.00 ROUTINE GENERAL MEDICAL EXAMINATION AT A HEALTH CARE FACILITY: ICD-10-CM

## 2019-08-19 RX ORDER — BUPROPION HYDROCHLORIDE 150 MG/1
150 TABLET ORAL EVERY MORNING
Qty: 30 TABLET | Refills: 1 | Status: CANCELLED | OUTPATIENT
Start: 2019-08-19

## 2019-08-19 RX ORDER — HYDROXYZINE PAMOATE 50 MG/1
50 CAPSULE ORAL EVERY 6 HOURS PRN
Qty: 30 CAPSULE | Refills: 0 | Status: SHIPPED | OUTPATIENT
Start: 2019-08-19

## 2019-08-19 RX ORDER — TRAZODONE HYDROCHLORIDE 100 MG/1
100 TABLET ORAL AT BEDTIME
Qty: 30 TABLET | Refills: 0 | Status: SHIPPED | OUTPATIENT
Start: 2019-08-19 | End: 2019-11-14

## 2019-08-19 NOTE — TELEPHONE ENCOUNTER
Last OV 07/24/2019. Please advice on approval Rx's were order by a non [facility] provider for 30 day supply 07/16/2019.

## 2019-08-19 NOTE — TELEPHONE ENCOUNTER
Reason for Call:  Medication or medication refill:    Do you use a [facility]?  Name of the pharmacy and phone number for the current request:  [id number] - [place], [place] - [address]    Name of the medication requested: traZODone (DESYREL) 100 MG tablet, buPROPion (WELLBUTRIN XL) 150 MG 24 hr tablet         Other request: The patient is currently out of both medications so he will need them filled as soon as possible.    Can we leave a detailed message on this number? YES    Phone number patient can be reached at: Cell number on file:    Telephone Information:   Mobile [phone]       Best Time: Any    Call taken on 8/19/2019 at 3:10 PM by Deann Lorenzo

## 2019-08-19 NOTE — TELEPHONE ENCOUNTER
"Requested Prescriptions   Pending Prescriptions Disp Refills     buPROPion (WELLBUTRIN XL) 150 MG 24 hr tablet  Last Written Prescription Date:  7/24/2019  Last Fill Quantity: 30 tablet,  # refills: 1   Last office visit: 7/24/2019 with prescribing provider:  Dean   Future Office Visit:   Next 5 appointments (look out 90 days)    Aug 27, 2019  1:00 PM CDT  [facility] [address] [place] [id number]  [phone]          30 tablet 1     Sig: Take 1 tablet (150 mg) by mouth every morning       SSRIs Protocol Passed - 8/19/2019  3:12 PM        Passed - Recent (12 mo) or future (30 days) visit within the authorizing provider's specialty     Patient had office visit in the last 12 months or has a visit in the next 30 days with authorizing provider or within the authorizing provider's specialty.  See \"Patient Info\" tab in inbasket, or \"Choose Columns\" in Meds & Orders section of the refill encounter.              Passed - Medication is Bupropion     If the medication is Bupropion (Wellbutrin), and the patient is taking for smoking cessation; OK to refill.          Passed - Medication is active on med list        Passed - Patient is age 18 or older        traZODone (DESYREL) 100 MG tablet  Last Written Prescription Date:  7/16/2019  Last Fill Quantity: 30 tablet,  # refills: 0   Last office visit: 7/24/2019 with prescribing provider:  Dean   Future Office Visit:   Next 5 appointments (look out 90 days)    Aug 27, 2019  1:00 PM CDT  [facility] [address] [place] [id number] [phone]          30 tablet 0     Sig: Take 1 tablet (100 mg) by mouth At Bedtime No driving a car or drinking alcohol for 8 hours after taking this " "medication.       Serotonin Modulators Passed - 8/19/2019  3:12 PM        Passed - Recent (12 mo) or future (30 days) visit within the authorizing provider's specialty     Patient had office visit in the last 12 months or has a visit in the next 30 days with authorizing provider or within the authorizing provider's specialty.  See \"Patient Info\" tab in inbasket, or \"Choose Columns\" in Meds & Orders section of the refill encounter.              Passed - Medication is active on med list        Passed - Patient is age 18 or older           "

## 2019-08-27 ENCOUNTER — OFFICE VISIT (OUTPATIENT)
Dept: FAMILY MEDICINE | Facility: CLINIC | Age: 24
End: 2019-08-27
Payer: COMMERCIAL

## 2019-08-27 VITALS
DIASTOLIC BLOOD PRESSURE: 62 MMHG | RESPIRATION RATE: 16 BRPM | OXYGEN SATURATION: 95 % | SYSTOLIC BLOOD PRESSURE: 118 MMHG | TEMPERATURE: 98.8 F | HEART RATE: 98 BPM | BODY MASS INDEX: 22.13 KG/M2 | WEIGHT: 133 LBS

## 2019-08-27 DIAGNOSIS — F99 INSOMNIA DUE TO OTHER MENTAL DISORDER: ICD-10-CM

## 2019-08-27 DIAGNOSIS — F43.23 ADJUSTMENT DISORDER WITH MIXED ANXIETY AND DEPRESSED MOOD: Primary | ICD-10-CM

## 2019-08-27 DIAGNOSIS — F51.05 INSOMNIA DUE TO OTHER MENTAL DISORDER: ICD-10-CM

## 2019-08-27 PROCEDURE — 99213 OFFICE O/P EST LOW 20 MIN: CPT | Performed by: FAMILY MEDICINE

## 2019-08-27 RX ORDER — BUPROPION HYDROCHLORIDE 300 MG/1
300 TABLET ORAL EVERY MORNING
Qty: 30 TABLET | Refills: 1 | Status: SHIPPED | OUTPATIENT
Start: 2019-08-27 | End: 2019-11-03

## 2019-08-27 ASSESSMENT — ANXIETY QUESTIONNAIRES
1. FEELING NERVOUS, ANXIOUS, OR ON EDGE: NEARLY EVERY DAY
GAD7 TOTAL SCORE: 13
3. WORRYING TOO MUCH ABOUT DIFFERENT THINGS: NEARLY EVERY DAY
2. NOT BEING ABLE TO STOP OR CONTROL WORRYING: NEARLY EVERY DAY
7. FEELING AFRAID AS IF SOMETHING AWFUL MIGHT HAPPEN: SEVERAL DAYS
6. BECOMING EASILY ANNOYED OR IRRITABLE: SEVERAL DAYS
GAD7 TOTAL SCORE: 13
5. BEING SO RESTLESS THAT IT IS HARD TO SIT STILL: SEVERAL DAYS
4. TROUBLE RELAXING: SEVERAL DAYS
7. FEELING AFRAID AS IF SOMETHING AWFUL MIGHT HAPPEN: SEVERAL DAYS
GAD7 TOTAL SCORE: 13

## 2019-08-27 ASSESSMENT — PATIENT HEALTH QUESTIONNAIRE - PHQ9
10. IF YOU CHECKED OFF ANY PROBLEMS, HOW DIFFICULT HAVE THESE PROBLEMS MADE IT FOR YOU TO DO YOUR WORK, TAKE CARE OF THINGS AT HOME, OR GET ALONG WITH OTHER PEOPLE: SOMEWHAT DIFFICULT
SUM OF ALL RESPONSES TO PHQ QUESTIONS 1-9: 8
SUM OF ALL RESPONSES TO PHQ QUESTIONS 1-9: 8

## 2019-08-27 NOTE — PROGRESS NOTES
Subjective     Mahin Porter is a 24 year old male who presents to clinic today for the following health issues:    HPI   Depression and Anxiety Follow-Up    How are you doing with your depression since your last visit? Pt isnt sure how he feels or if medication is working.  Pt states that he is not as shaky.  Pt states that he is waking up in the middle of the night and would like to discuss an increase in sleeping medicine.    How are you doing with your anxiety since your last visit?  No change    Are you having other symptoms that might be associated with depression or anxiety? No    Have you had a significant life event? No     Do you have any concerns with your use of alcohol or other drugs? No    Social History     Tobacco Use     Smoking status: Current Every Day Smoker     Packs/day: 0.50     Types: Cigarettes     Start date: 2010     Smokeless tobacco: Current User   Substance Use Topics     Alcohol use: Yes     Comment: holidays     Drug use: Yes     Types: Marijuana     PHQ 7/24/2019 8/27/2019   PHQ-9 Total Score 15 8   Q9: Thoughts of better off dead/self-harm past 2 weeks Not at all Not at all     MAD[place]-7 SCORE 7/24/2019 8/27/2019   Total Score 18 (severe anxiety) 13 (moderate anxiety)   Total Score 18 13     No flowsheet data found.  No flowsheet data found.      Suicide Assessment Five-step Evaluation and Treatment (SAFE-T)      How many servings of fruits and vegetables do you eat daily?  0-1    On average, how many sweetened beverages do you drink each day (soda, juice, sweet tea, etc)?   1    How many days per week do you miss taking your medication? 0            Patient Active Problem List   Diagnosis     Adjustment disorder with mixed anxiety and depressed mood     Palpitations     Sinus tachycardia     Anxiety     Insomnia due to other mental disorder     History reviewed. No pertinent surgical history.    Social History     Tobacco Use     Smoking status: Current Every Day Smoker     Packs/day:  0.50     Types: Cigarettes     Start date: 2010     Smokeless tobacco: Current User   Substance Use Topics     Alcohol use: Yes     Comment: holidays     Family History   Problem Relation Age of Onset     No Known Problems Mother      Coronary Artery Disease Father      Depression Father      Lung Cancer Maternal Grandmother      Lymphoma Maternal Grandfather      Lung Cancer Paternal Grandmother      Depression Sister      Anxiety Disorder Sister      Depression Sister              Reviewed and updated as needed this visit by Provider         Review of Systems   ROS COMP: Constitutional, HEENT, cardiovascular, pulmonary, gi and gu systems are negative, except as otherwise noted.      Objective    /62 (Patient Position: Sitting, Cuff Size: Adult Regular)   Pulse 98   Temp 98.8  F (37.1  C) (Tympanic)   Resp 16   Wt 60.3 kg (133 lb)   SpO2 95%   BMI 22.13 kg/m    Body mass index is 22.13 kg/m .  Physical Exam   GENERAL APPEARANCE: healthy, alert and no distress  HENT: nose and mouth without ulcers or lesions            Assessment & Plan       ICD-10-CM    1. Adjustment disorder with mixed anxiety and depressed mood F43.23 buPROPion (WELLBUTRIN XL) 300 MG 24 hr tablet   2. Insomnia due to other mental disorder F51.05     F99         Tobacco Cessation:   reports that he has been smoking cigarettes.  He started smoking about 9 years ago. He has been smoking about 0.50 packs per day. He uses smokeless tobacco.  Tobacco Cessation Action Plan: Information offered: Patient not interested at this time        Patient Instructions   I will have the patient increase his Wellbutrin to 300 mg daily.  He will try that for a couple of weeks to see how his sleep patterns adjust.  If he continues to have problems with that at that point he will increase his trazodone to 150 mg nightly.  Follow-up will be in a roughly 6 weeks sooner as needed.      Return in about 6 weeks (around 10/8/2019).    Reed Moran,  MD  [facility]

## 2019-08-27 NOTE — PATIENT INSTRUCTIONS
I will have the patient increase his Wellbutrin to 300 mg daily.  He will try that for a couple of weeks to see how his sleep patterns adjust.  If he continues to have problems with that at that point he will increase his trazodone to 150 mg nightly.  Follow-up will be in a roughly 6 weeks sooner as needed.

## 2019-08-28 ASSESSMENT — ANXIETY QUESTIONNAIRES: GAD7 TOTAL SCORE: 13

## 2019-12-18 DIAGNOSIS — F41.9 ANXIETY: ICD-10-CM

## 2019-12-18 RX ORDER — TRAZODONE HYDROCHLORIDE 100 MG/1
TABLET ORAL
Qty: 30 TABLET | Refills: 0 | OUTPATIENT
Start: 2019-12-18

## 2019-12-18 NOTE — TELEPHONE ENCOUNTER
"Requested Prescriptions   Pending Prescriptions Disp Refills     traZODone (DESYREL) 100 MG tablet [Pharmacy Med Name: TRAZODONE 100MG TABLETS]  Last Written Prescription Date:  11/19/2019  Last Fill Quantity: 30 tablet,  # refills: 0   Last office visit: 8/27/2019 with prescribing provider:  Dean   Future Office Visit:     30 tablet 0     Sig: TAKE 1 TABLET BY MOUTH AT BEDTIME NO DRIVING A CAR OR DRINKING ALCOHOL FOR 8 HOURS AFTER TAKING THIS MEDICATION       Serotonin Modulators Passed - 12/18/2019  5:32 AM        Passed - Recent (12 mo) or future (30 days) visit within the authorizing provider's specialty     Patient has had an office visit with the authorizing provider or a provider within the authorizing providers department within the previous 12 mos or has a future within next 30 days. See \"Patient Info\" tab in inbasket, or \"Choose Columns\" in Meds & Orders section of the refill encounter.              Passed - Medication is active on med list        Passed - Patient is age 18 or older           "

## 2020-03-11 ENCOUNTER — HEALTH MAINTENANCE LETTER (OUTPATIENT)
Age: 25
End: 2020-03-11

## 2021-01-03 ENCOUNTER — HEALTH MAINTENANCE LETTER (OUTPATIENT)
Age: 26
End: 2021-01-03

## 2021-10-10 ENCOUNTER — HEALTH MAINTENANCE LETTER (OUTPATIENT)
Age: 26
End: 2021-10-10

## 2022-01-30 ENCOUNTER — HEALTH MAINTENANCE LETTER (OUTPATIENT)
Age: 27
End: 2022-01-30

## 2022-09-18 ENCOUNTER — HEALTH MAINTENANCE LETTER (OUTPATIENT)
Age: 27
End: 2022-09-18

## 2023-05-07 ENCOUNTER — HEALTH MAINTENANCE LETTER (OUTPATIENT)
Age: 28
End: 2023-05-07